# Patient Record
Sex: FEMALE | Race: WHITE
[De-identification: names, ages, dates, MRNs, and addresses within clinical notes are randomized per-mention and may not be internally consistent; named-entity substitution may affect disease eponyms.]

---

## 2019-06-04 ENCOUNTER — HOSPITAL ENCOUNTER (OUTPATIENT)
Dept: HOSPITAL 76 - DI.WCP | Age: 24
Discharge: HOME | End: 2019-06-04
Attending: PHYSICIAN ASSISTANT
Payer: COMMERCIAL

## 2019-06-04 DIAGNOSIS — M77.31: Primary | ICD-10-CM

## 2019-06-05 NOTE — XRAY REPORT
Reason:  ANKLE PAIN,RIGHT

Procedure Date:  06/04/2019   

Accession Number:  [id number] / [id number]                    

Procedure:  WCP - Ankle 2 View RT CPT Code:  

 

FULL RESULT:

 

 

EXAM:

RIGHT ANKLE RADIOGRAPHY

 

EXAM DATE: 6/4/2019 04:08 PM.

 

CLINICAL HISTORY: Ankle pain, right.

 

COMPARISON: None.

 

TECHNIQUE: 2 views.

 

FINDINGS:

Bones: Minimal inferior calcaneal spurring. No significant spurring of 

the Achilles insertion. No fractures or bone lesions.

 

Joints: Normal. No effusion. No subluxations. The ankle mortise is 

normally aligned.

 

Soft Tissues: Normal. No soft tissue swelling.

IMPRESSION: Minimal inferior calcaneal spurring.

 

[facility]

## 2019-07-15 ENCOUNTER — HOSPITAL ENCOUNTER (OUTPATIENT)
Dept: HOSPITAL 76 - DI | Age: 24
Discharge: HOME | End: 2019-07-15
Attending: PHYSICIAN ASSISTANT
Payer: COMMERCIAL

## 2019-07-15 DIAGNOSIS — M76.61: Primary | ICD-10-CM

## 2019-07-15 PROCEDURE — 76882 US LMTD JT/FCL EVL NVASC XTR: CPT

## 2019-07-16 NOTE — ULTRASOUND REPORT
Reason:  ACHILLES TENDONITIS, RIGHT

Procedure Date:  07/15/2019   

Accession Number:  [id number] / [id number]                    

Procedure:  US  - Ext Limited Non Vascular CPT Code:  

 

FULL RESULT:

 

 

EXAM:

RIGHT LOWER EXTREMITY ULTRASOUND - LIMITED

 

EXAM DATE: 7/15/2019 03:28 PM.

 

CLINICAL HISTORY: Achilles tendonitis, right.

 

COMPARISON: None.

 

TECHNIQUE: Real-time scanning was performed with static images obtained.

 

FINDINGS: Musculoskeletal real-time examination by the radiologist was 

performed of the Achilles tendon insertion region including the nearby 

bursa. Additional real-time imaging of the area pointed out as painful by 

the patient, medial malleolus was performed.

 

The Achilles tendon, its insertion and the retrocalcaneal bursa all 

appear normal. Limited evaluation of the medial malleolus reveals no 

abnormality.

IMPRESSION: Normal study.

 

[facility]